# Patient Record
Sex: FEMALE | Race: WHITE | Employment: FULL TIME | URBAN - METROPOLITAN AREA
[De-identification: names, ages, dates, MRNs, and addresses within clinical notes are randomized per-mention and may not be internally consistent; named-entity substitution may affect disease eponyms.]

---

## 2024-03-25 ENCOUNTER — HOSPITAL ENCOUNTER (OUTPATIENT)
Age: 34
Discharge: HOME | End: 2024-03-25
Payer: COMMERCIAL

## 2024-03-25 VITALS
OXYGEN SATURATION: 98 % | DIASTOLIC BLOOD PRESSURE: 88 MMHG | RESPIRATION RATE: 16 BRPM | TEMPERATURE: 98 F | SYSTOLIC BLOOD PRESSURE: 138 MMHG | HEART RATE: 81 BPM

## 2024-03-25 DIAGNOSIS — M54.41 ACUTE RIGHT-SIDED LOW BACK PAIN WITH RIGHT-SIDED SCIATICA: Primary | ICD-10-CM

## 2024-03-25 PROCEDURE — 99203 OFFICE O/P NEW LOW 30 MIN: CPT | Performed by: NURSE PRACTITIONER

## 2024-03-25 PROCEDURE — 96372 THER/PROPH/DIAG INJ SC/IM: CPT | Performed by: NURSE PRACTITIONER

## 2024-03-25 RX ORDER — DIAZEPAM 5 MG/1
5 TABLET ORAL 2 TIMES DAILY PRN
Qty: 14 TABLET | Refills: 0 | Status: SHIPPED | OUTPATIENT
Start: 2024-03-25

## 2024-03-25 RX ORDER — KETOROLAC TROMETHAMINE 30 MG/ML
15 INJECTION, SOLUTION INTRAMUSCULAR; INTRAVENOUS ONCE
Status: COMPLETED | OUTPATIENT
Start: 2024-03-25 | End: 2024-03-25

## 2024-03-25 RX ORDER — DIAZEPAM 5 MG/1
5 TABLET ORAL ONCE
Status: COMPLETED | OUTPATIENT
Start: 2024-03-25 | End: 2024-03-25

## 2024-03-25 NOTE — ED INITIAL ASSESSMENT (HPI)
Pt c/o R low back pain x1 week, worse since yesterday with shoot pain down R leg.  No falls or injury.  No bowel or bladder incontinence.  No urinary symptoms.

## 2024-03-25 NOTE — DISCHARGE INSTRUCTIONS
Follow up with your doctor as needed if pain continued despite medication therapy given.    Take DIAZEPAM 5mg two times a day for muscle relaxer/pain. DO NOT TAKE IF working, driving or drinking alcohol. This can make you sleepy or dizzy. INSTEAD YOU CAN: Take 1 tablet at night before bedtime if active during the day.    Take ibuprofen 600mg every 6 hours for pain.    Use heating pad to area of pain 15 min on 2 hours off for the next few days to help with pain/comfort.    Increase water hydration, rest. No heavy lifting, running, strenuous activities while muscle recover.    RETURN OR GO TO ED for worsening pain, dizziness, chest pain or shortness of breath, incontinence of bowel or bladder, numbness around pelvis

## 2024-03-25 NOTE — ED PROVIDER NOTES
Patient Seen in: [facility]      History     Chief Complaint   Patient presents with    Back Pain     Stated Complaint: back pain    Subjective:   HPI    34 yr old female here for right sided low back pain radiating down right leg worse since yesterday. She has had some dull diffuse right low back pain the last 10 days, taking ibuprofen. She denies falls, trauma, heavy lifting, incontinence, hematuria, frequency or urgency in urination. She does reports walking/jogging on tread last week in preparation for a 5K, but nothing significant as far as pain occurred then. LMP last Wednesday.  Denies back injury in past. She has been taking ibuprofen and using heat/ice with not much help for the last 24 hours.    Objective:   History reviewed. No pertinent past medical history.           History reviewed. No pertinent surgical history.             Social History     Socioeconomic History    Marital status: Single   Tobacco Use    Smoking status: Never    Smokeless tobacco: Never   Vaping Use    Vaping Use: Never used   Substance and Sexual Activity    Alcohol use: Yes     Comment: occ    Drug use: Yes     Types: Cannabis     Comment: occ              Review of Systems    Positive for stated complaint: back pain  Other systems are as noted in HPI.  Constitutional and vital signs reviewed.      All other systems reviewed and negative except as noted above.    Physical Exam     ED Triage Vitals [03/25/24 0910]   /88   Pulse 81   Resp 16   Temp 97.5 °F (36.4 °C)   Temp src Temporal   SpO2 98 %   O2 Device None (Room air)       Current:/88   Pulse 81   Temp 97.5 °F (36.4 °C) (Temporal)   Resp 16   LMP 03/20/2024 (Exact Date)   SpO2 98%         Physical Exam  Vitals and nursing note reviewed.   Constitutional:       General: She is not in acute distress.     Appearance: Normal appearance. She is not ill-appearing or toxic-appearing.   Cardiovascular:      Rate and Rhythm: Normal rate.      Pulses:  Normal pulses.   Pulmonary:      Effort: Pulmonary effort is normal. No respiratory distress.   Musculoskeletal:      Cervical back: Normal range of motion. No tenderness, bony tenderness or crepitus. No pain with movement.      Thoracic back: No bony tenderness.      Lumbar back: Spasms and tenderness present. No swelling, edema or bony tenderness. Decreased range of motion (painful).   Skin:     General: Skin is warm.   Neurological:      Mental Status: She is alert and oriented to person, place, and time.   Psychiatric:         Mood and Affect: Mood normal.         Behavior: Behavior normal.               ED Course   Labs Reviewed - No data to display                   MDM     34 yr old female here for evaluation of right sided low back pain x 10 days, worse since last night w pain down right leg.     ON exam, pt well appearing at rest, painful ROM to back with change of position, sit to stand. TTP To right paraspinal lumbar muscles, buttock down leg. Breathing easy. No abdominal pain.     Diff dx: sciatica vs low back strain.    PT has been taking ibuprofen. PT not driving. Will give dose of diazepam PO, toradol IM for pain relief now. She is not working today and can rest. Discussed side effects of muscle relaxers, continued ibuprofen use, topical heat best.    PT agrees with plan of care.  All questions answered. Return and ER precautions given.    Counseled: Patient, regarding diagnosis, regarding treatment plan, regarding diagnostic results, regarding prescription, I have discussed with the patient the results of tests, differential diagnosis, and warning signs and symptoms that should prompt immediate return. The patient understands these instructions and agrees to the follow-up plan provided. There is no barriers to learning. Appropriate f/u given. Patient agrees to return for any concerns/ problems/complications.                                     Medical Decision Making      Disposition and Plan      Clinical Impression:  1. Acute right-sided low back pain with right-sided sciatica         Disposition:  Discharge  3/25/2024  9:42 am    Follow-up:  Eriberto Lee  [id number] [address] [address]  [phone]      As needed          Medications Prescribed:  Current Discharge Medication List        START taking these medications    Details   diazePAM 5 MG Oral Tab Take 1 tablet (5 mg total) by mouth 2 (two) times daily as needed (muscle relaxer).  Qty: 14 tablet, Refills: 0    Associated Diagnoses: Acute right-sided low back pain with right-sided sciatica

## 2024-09-23 ENCOUNTER — HOSPITAL ENCOUNTER (OUTPATIENT)
Age: 34
Discharge: HOME | End: 2024-09-23
Payer: COMMERCIAL

## 2024-09-23 VITALS
RESPIRATION RATE: 18 BRPM | HEART RATE: 64 BPM | DIASTOLIC BLOOD PRESSURE: 85 MMHG | OXYGEN SATURATION: 100 % | SYSTOLIC BLOOD PRESSURE: 130 MMHG | TEMPERATURE: 98 F

## 2024-09-23 DIAGNOSIS — R35.0 URINARY FREQUENCY: Primary | ICD-10-CM

## 2024-09-23 LAB
B-HCG UR QL: NEGATIVE
BILIRUB UR QL STRIP: NEGATIVE
CLARITY UR: CLEAR
COLOR UR: YELLOW
GLUCOSE UR STRIP-MCNC: NEGATIVE MG/DL
HGB UR QL STRIP: NEGATIVE
KETONES UR STRIP-MCNC: NEGATIVE MG/DL
LEUKOCYTE ESTERASE UR QL STRIP: NEGATIVE
NITRITE UR QL STRIP: NEGATIVE
PH UR STRIP: 6.5 [PH]
PROT UR STRIP-MCNC: NEGATIVE MG/DL
SP GR UR STRIP: <=1.005
UROBILINOGEN UR STRIP-ACNC: <2 MG/DL

## 2024-09-23 PROCEDURE — 81002 URINALYSIS NONAUTO W/O SCOPE: CPT | Performed by: NURSE PRACTITIONER

## 2024-09-23 PROCEDURE — 99213 OFFICE O/P EST LOW 20 MIN: CPT | Performed by: NURSE PRACTITIONER

## 2024-09-23 PROCEDURE — 81025 URINE PREGNANCY TEST: CPT | Performed by: NURSE PRACTITIONER

## 2024-09-23 PROCEDURE — 87086 URINE CULTURE/COLONY COUNT: CPT | Performed by: NURSE PRACTITIONER

## 2024-09-23 NOTE — DISCHARGE INSTRUCTIONS
As we discussed the urinalysis that we do in the immediate care was negative for any urinary tract infection, also negative for pregnancy.  A urine culture will be sent to the lab for microscopic analysis.  If this shows that there is a urinary tract infection we will contact you and antibiotics may be started at that time.  If you develop fever, abdominal pain, back pain, or other concerns do not hesitate to return here, or go to the ER, or see your primary care provider.  Drink lots of oral fluids, may take Tylenol or ibuprofen if you develop any discomfort or fever.  Thank you for choosing our [facility] for your medical condition today. Please be sure to follow up with your primary care provider in 2 days if no improvement, or as directed. If any worsening of your symptoms or other concerns call your primary care provider, return here or go to the emergency department.

## 2024-09-23 NOTE — ED PROVIDER NOTES
Patient Seen in: [facility]      History     Chief Complaint   Patient presents with    Urinary Symptoms     I think it’s a possible UTI. - Entered by patient     Stated Complaint: Urinary Symptoms - I think it’s a possible UTI.    Subjective:   This is a 34-year-old  female presents with a chief complaint of urinary frequency for the past 2 days.  Patient denies dysuria, she denies flank pain or abdominal pain.  She denies any nausea or vomiting.  Patient states she did have 1 day with diarrhea and that was at the onset of her symptoms.  She denies any fever, chills, vaginal discharge, or abnormal vaginal bleeding.  She is a  0 para 0 in a monogamous relationship and denies possibility of STD.  Patient is currently taking oral contraceptives.  She does state that she has been wearing body suits frequently for the past 10 days and wonders if that has something to do with that.  She denies a history of frequent urinary tract infections.  Patient states she has taken Azo for the past couple of days to manage her symptoms.    The history is provided by the patient.           Objective:   History reviewed. No pertinent past medical history.           History reviewed. No pertinent surgical history.             Social History     Socioeconomic History    Marital status: Single   Tobacco Use    Smoking status: Never    Smokeless tobacco: Never   Vaping Use    Vaping status: Never Used   Substance and Sexual Activity    Alcohol use: Yes     Comment: occ    Drug use: Yes     Types: Cannabis     Comment: occ     Social Determinants of Health     Housing Stability: Not At Risk (3/8/2024)    [facility] from [facility], [facility]     What is your living situation today?: I have a steady place to live              Review of Systems   Constitutional:  Negative for activity change, appetite change and fatigue.   Respiratory:  Negative for shortness of breath.    Cardiovascular:   Negative for chest pain.   Gastrointestinal:  Positive for diarrhea. Negative for abdominal distention, abdominal pain, blood in stool, nausea and vomiting.   Genitourinary:  Positive for frequency. Negative for difficulty urinating, dysuria, flank pain, hematuria and vaginal discharge.   Musculoskeletal:  Negative for back pain and myalgias.       Positive for stated Chief Complaint: Urinary Symptoms (I think it’s a possible UTI. - Entered by patient)    Other systems are as noted in HPI.  Constitutional and vital signs reviewed.      All other systems reviewed and negative except as noted above.    Physical Exam     ED Triage Vitals [09/23/24 1729]   /85   Pulse 64   Resp 18   Temp 97.5 °F (36.4 °C)   Temp src Temporal   SpO2 100 %   O2 Device None (Room air)       Current Vitals:   Vital Signs  BP: 130/85  Pulse: 64  Resp: 18  Temp: 97.5 °F (36.4 °C)  Temp src: Temporal    Oxygen Therapy  SpO2: 100 %  O2 Device: None (Room air)            Physical Exam  Vitals and nursing note reviewed.   Constitutional:       General: She is not in acute distress.     Appearance: Normal appearance. She is normal weight. She is not ill-appearing or toxic-appearing.   HENT:      Head: Normocephalic and atraumatic.   Eyes:      Conjunctiva/sclera: Conjunctivae normal.   Cardiovascular:      Rate and Rhythm: Normal rate and regular rhythm.      Heart sounds: No murmur heard.  Pulmonary:      Effort: Pulmonary effort is normal.      Breath sounds: Normal breath sounds. No wheezing, rhonchi or rales.   Abdominal:      General: There is no distension.      Palpations: Abdomen is soft. There is no mass.      Tenderness: There is no abdominal tenderness. There is no right CVA tenderness, left CVA tenderness or guarding.   Skin:     General: Skin is warm and dry.      Findings: No erythema or rash.   Neurological:      General: No focal deficit present.      Mental Status: She is alert and oriented to person, place, and time.    Psychiatric:         Mood and Affect: Mood normal.         Behavior: Behavior normal.         Thought Content: Thought content normal.         Judgment: Judgment normal.               ED Course     Labs Reviewed   POCT PREGNANCY URINE - Normal   [facility] POCT URINALYSIS DIPSTICK   URINE CULTURE, ROUTINE                      MDM                                        Medical Decision Making  Differential diagnoses: Acute cystitis, pyelonephritis, renal colic, PID, STD, urethritis.  Cormorbidities adding complexity:  My independent interpretation of studies: Urinalysis within normal limits.  Will send urine culture.  Social determinants of health affecting care: None noted  Shared decision making done by: The patient and myself.  Discussed negative UA at this time and patient agrees to send a urine culture and treat if that should show a UTI.  Patient advised to drink lots of fluids and to return here or see her primary care provider if the symptoms continue or if she develops dysuria, blood in her urine, abdominal pain or other concerns.  Patient agrees with this plan of care.          Disposition and Plan     Clinical Impression:  1. Urinary frequency         Disposition:  Discharge  9/23/2024  5:49 pm    Follow-up:  Eriberto Lee  [address] [place] [address]  [phone]    In 3 days         Counseled: Patient, regarding diagnosis, regarding treatment plan, regarding diagnostic results.  I have discussed with the patient the results of tests, differential diagnosis, and warning signs and symptoms that should prompt immediate return. The patient understands these instructions and agrees to the follow-up plan provided. There is no barriers to learning. Appropriate f/u given. Emergency precautions given. Patient agrees to return for any concerns/ problems/complications.        Medications Prescribed:  Discharge Medication List as of 9/23/2024  5:49 PM